# Patient Record
Sex: MALE | Race: WHITE | Employment: FULL TIME | URBAN - NONMETROPOLITAN AREA
[De-identification: names, ages, dates, MRNs, and addresses within clinical notes are randomized per-mention and may not be internally consistent; named-entity substitution may affect disease eponyms.]

---

## 2021-01-06 ENCOUNTER — OFFICE VISIT (OUTPATIENT)
Dept: PSYCHOLOGY | Age: 46
End: 2021-01-06
Payer: COMMERCIAL

## 2021-01-06 DIAGNOSIS — F43.23 ADJUSTMENT DISORDER WITH MIXED ANXIETY AND DEPRESSED MOOD: Primary | ICD-10-CM

## 2021-01-06 DIAGNOSIS — F51.05 INSOMNIA DUE TO ANXIETY AND FEAR: ICD-10-CM

## 2021-01-06 DIAGNOSIS — F40.9 INSOMNIA DUE TO ANXIETY AND FEAR: ICD-10-CM

## 2021-01-06 PROCEDURE — 90791 PSYCH DIAGNOSTIC EVALUATION: CPT | Performed by: PSYCHOLOGIST

## 2021-01-06 ASSESSMENT — PATIENT HEALTH QUESTIONNAIRE - PHQ9
SUM OF ALL RESPONSES TO PHQ9 QUESTIONS 1 & 2: 2
10. IF YOU CHECKED OFF ANY PROBLEMS, HOW DIFFICULT HAVE THESE PROBLEMS MADE IT FOR YOU TO DO YOUR WORK, TAKE CARE OF THINGS AT HOME, OR GET ALONG WITH OTHER PEOPLE: 1
5. POOR APPETITE OR OVEREATING: 0
6. FEELING BAD ABOUT YOURSELF - OR THAT YOU ARE A FAILURE OR HAVE LET YOURSELF OR YOUR FAMILY DOWN: 0
SUM OF ALL RESPONSES TO PHQ QUESTIONS 1-9: 8
2. FEELING DOWN, DEPRESSED OR HOPELESS: 1
4. FEELING TIRED OR HAVING LITTLE ENERGY: 3

## 2021-01-06 NOTE — PATIENT INSTRUCTIONS
Try this method of journaling:     -4 things that went well today   -3 things you are thankful for   -2 things that didn't go as well today but you can learn from   -1 thing you are looking forward to      Dream re-scripting: In a nutshell, this uses techniques that help people with PTSD rescript or alter the endings of their nightmares while they are awake. When you come up with an alternative, less distressing outcome, nightmares can become less upsetting and debilitating. Spending time in the afternoon or evening picking one dream and writing from the beginning, middle, and coming up with a new, alternative ending. Bring this to the session and we can discuss.

## 2021-01-06 NOTE — PROGRESS NOTES
Behavioral Health Consultation/Psychotherapy Note  Yue Records. Pili Garcia Psy.D. Visit Date:  1/6/2021    Patient:  Pete Ramos  YOB: 1975  Chief Complaint:  New Patient, Anxiety, and Stress    Duration of session:  50 minutes      S:     Ct said he hasn't been able to sleep well, having recurring dreams that started in May 2020. 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 He has been tearful at times, getting anxious, shaky, and feeling claustrophobic. He has been present for a lot of COVID-19 deaths, which he didn't realized bothered him as much as they do. He has bad dreams 4-5 days per week. He has a recurring dream that he's at , going into a pt's room, sees a resident, feels death all around, sees a woman's face. Other themes include death, stress, etc.  The worst dream he has twice monthly he has where he thinks he murdered someone and he has to hide the body. He knows he didn't do it, but it still is recurring. He wakes up often from the dream, and gets about 4-5 hours of sleep. He has gotten more anxious at night knowing he may not sleep well, and wake up anxious during the night, and tried in the morning. He has talked to his wife about it, and she's supportive of him. He keeps active, exercises often, gets 10 K steps per day, walks the dog, has a healthy diet. He said he has taken Zoloft and Paxil in the past.  He has 2 kids, age 15 and 8. He said he doesn't feel like he's himself when he's on Zoloft, numbness, staring, so much that his family notices. Aside from his wife, he has some friends, but hasn't talked to them much since COVID-19 started. He has a decent relationship with his parents but doesn't discuss things with them. Treatment options were presented today, starting with a dream re-scripting writing exercise and potential EMDR in the future.       O:    Appearance    Patient presents as alert, oriented, and cooperative  Appetite normal  Sleep disturbance Yes  Loss of pleasure Some  Speech    normal rate, normal volume, well articulated and clear and understandable  Mood    Euthymic, somewhat anxious   Affect    normal affect  Thought Process    goal directed and linear and coherent  Insight    Good  Judgment    Intact  Memory    recent and remote memory intact  Suicide Assessment    no suicidal ideation      A:    1. Adjustment disorder with mixed anxiety and depressed mood    2. Insomnia due to anxiety and fear        Ct's score on PHQ-9 and VERO-7 do not indicate presence of a MDD or VERO. Adjustment D/O seems most appropriate at this time. Ct is agreeable to psychotherapy to address dreams, anxiety, and insomnia. EMDR may also be considered in future. PHQ Scores 1/6/2021   PHQ2 Score 2   PHQ9 Score 8     Interpretation of Total Score Depression Severity: 1-4 = Minimal depression, 5-9 = Mild depression, 10-14 = Moderate depression, 15-19 = Moderately severe depression, 20-27 = Severe depression      VERO-7    Over the last 2 weeks, how often have you been bothered by the following problems? 1. Feeling nervous, anxious, or on edge   [  ] Not at all (0)  [ X ] Several days (1)  [  ] Over half the days (2)  [  ] Nearly every day (3)    2. Not being able to stop or control worrying    [  ] Not at all (0)  [ X ] Several days (1)  [  ] Over half the days (2)  [  ] Nearly every day (3)    3. Worrying too much about different things    [ X ] Not at all (0)  [  ] Several days (1)  [  ] Over half the days (2)  [  ] Nearly every day (3)    4. Trouble relaxing    [ X ] Not at all (0)  [  ] Several days (1)  [  ] Over half the days (2)  [  ] Nearly every day (3)    5.  Being so restless that its hard to sit still    [ X ] Not at all (0)  [  ] Several days (1)  [  ] Over half the days (2)  [  ] Nearly every day (3)    6. Becoming easily annoyed or irritable    [  ] Not at all (0)  [ X ] Several days (1)  [  ] Over half the days (2)  [  ] Nearly every day (3)    7. Feeling afraid as if something awful might happen    [  ] Not at all (0)  [ X ] Several days (1)  [  ] Over half the days (2)  [  ] Nearly every day (3)    Total Score:  Severity:  [ 4 ] 0-4 Subclinical  [  ] 5-9 Mild  [  ] 10-14 Moderate  [  ] 15-21 Severe        P:    Dream re-scripting (writing or verbal) exercise. Daily gratitude list.    Doxy visit in 2 weeks. All questions about treatment plan answered. Patient instructed to go immediately to the emergency room and/or call 911 if any suicidal or homicidal ideations. Patient stated understanding and is agreeable to treatment and crisis plan.           Provider Signature:  Electronically signed by Gabriela Ag PSYD on 1/6/2021 at 11:16 AM

## 2021-01-21 ENCOUNTER — VIRTUAL VISIT (OUTPATIENT)
Dept: PSYCHOLOGY | Age: 46
End: 2021-01-21
Payer: COMMERCIAL

## 2021-01-21 DIAGNOSIS — F43.23 ADJUSTMENT DISORDER WITH MIXED ANXIETY AND DEPRESSED MOOD: Primary | ICD-10-CM

## 2021-01-21 DIAGNOSIS — F40.9 INSOMNIA DUE TO ANXIETY AND FEAR: ICD-10-CM

## 2021-01-21 DIAGNOSIS — F51.05 INSOMNIA DUE TO ANXIETY AND FEAR: ICD-10-CM

## 2021-01-21 PROCEDURE — 90834 PSYTX W PT 45 MINUTES: CPT | Performed by: PSYCHOLOGIST

## 2021-01-21 NOTE — PROGRESS NOTES
Behavioral Health Consultation/Psychotherapy Note  Samantha Jewell. Jai Brown Psy.D. Visit Date:  1/21/2021    Patient:  Lyn Jordan  YOB: 1975  Chief Complaint:  Follow-up, Anxiety, and Stress    Duration of session:  45 minutes      S:     This session was conducted as a telepsychology visit due to [facility] restrictions placed on in-person visits d/t the COVID-19 outbreak. Patient Location: Home       Provider Location ([facility]): [facility]    Patient gave verbal consent for teleservices and will sign a consent form when feasible. This virtual visit was conducted via interactive/real-time audio/video, using Doxy. me. Ct has been off for 2 weeks of work after getting a concussion from being knocked over by his dog. He hasn't been sleeping as well, but is trying to be thankful, slow down, and not take things for granted. He hasn't had the recurring dreams anymore, but we talked about how they could return. We reviewed mindful meditations that can help today. O:    Appearance    Patient presents as alert, oriented, and cooperative  Appetite normal  Sleep disturbance Yes  Loss of pleasure Some  Speech    normal rate, normal volume, well articulated and clear and understandable  Mood    Euthymic, somewhat anxious              Affect    normal affect  Thought Process    goal directed and linear and coherent  Insight    Good  Judgment    Intact  Memory    recent and remote memory intact  Suicide Assessment    no suicidal ideation      A:    1. Adjustment disorder with mixed anxiety and depressed mood    2. Insomnia due to anxiety and fear        Continued psychotherapy using mindfulness meditation, CBT, and anxiety-reduction techniques are needed to assist with returning to a level of well-being. P:    Doxy visit in 2-4 weeks. All questions about treatment plan answered. Patient instructed to go immediately to the emergency room and/or call 911 if any suicidal or

## 2021-02-18 ENCOUNTER — VIRTUAL VISIT (OUTPATIENT)
Dept: PSYCHOLOGY | Age: 46
End: 2021-02-18
Payer: COMMERCIAL

## 2021-02-18 DIAGNOSIS — F40.9 INSOMNIA DUE TO ANXIETY AND FEAR: ICD-10-CM

## 2021-02-18 DIAGNOSIS — F43.23 ADJUSTMENT DISORDER WITH MIXED ANXIETY AND DEPRESSED MOOD: Primary | ICD-10-CM

## 2021-02-18 DIAGNOSIS — F51.05 INSOMNIA DUE TO ANXIETY AND FEAR: ICD-10-CM

## 2021-02-18 PROCEDURE — 90834 PSYTX W PT 45 MINUTES: CPT | Performed by: PSYCHOLOGIST

## 2021-02-18 NOTE — PROGRESS NOTES
Behavioral Health Consultation/Psychotherapy Note  Samantha Jewell. Jai Brown Psy.D. Visit Date:  2021    Patient:  Lyn Jordan  YOB: 1975  Chief Complaint:  Follow-up, Anxiety, Depression, and Stress    Duration of session:  50 minutes      S:     This session was conducted as a telepsychology visit due to [facility] restrictions placed on in-person visits d/t the COVID-19 outbreak. Patient Location: Home       Provider Location ([facility]    Patient gave verbal consent for teleservices and will sign a consent form when feasible. This virtual visit was conducted via interactive/real-time audio/video, using Doxy. me. Ct said his son's friend  in a snowmobile accident, so that's why he couldn't make the last appt. He is helping his son cope. 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 He has an angiogram in a month to really tell if it's an aneurysm or not. He said this has been weighing on his mind a lot. He's not afraid of dying, and feels more at ease about it. He's about 25% upset about it. He's also had the recurring dream again, but not as often, and his brain has been able to stop the dream and switch to something else. He's happy with where things are but wondered what our plan would be should the dreams return. We talked about verbally processing, writing/rescripting, and EMDR as potential treatment options. O:    Appearance    Patient presents as alert, oriented, and cooperative  Appetite normal  Sleep disturbance Yes  Loss of pleasure Some  Speech    normal rate, normal volume, well articulated and clear and understandable  Mood    Euthymic, somewhat anxious              Affect    normal affect  Thought Process    goal directed and linear and coherent  Insight    Good  Judgment    Intact  Memory    recent and remote memory intact  Suicide Assessment    no suicidal ideation    A:    1.  Adjustment disorder with mixed anxiety and depressed mood    2. Insomnia due to anxiety and fear        Continued psychotherapy using mindfulness meditation, CBT, and anxiety-reduction techniques are needed to assist with returning to a level of well-being. P:    Doxy visit in 1 month. All questions about treatment plan answered. Patient instructed to go immediately to the emergency room and/or call 911 if any suicidal or homicidal ideations. Patient stated understanding and is agreeable to treatment and crisis plan.           Provider Signature:  Electronically signed by Pooja Anthony PSYD on 2/18/2021 at 3:04 PM

## 2021-03-16 ENCOUNTER — VIRTUAL VISIT (OUTPATIENT)
Dept: PSYCHOLOGY | Age: 46
End: 2021-03-16
Payer: COMMERCIAL

## 2021-03-16 DIAGNOSIS — F43.23 ADJUSTMENT DISORDER WITH MIXED ANXIETY AND DEPRESSED MOOD: Primary | ICD-10-CM

## 2021-03-16 DIAGNOSIS — F51.05 INSOMNIA DUE TO ANXIETY AND FEAR: ICD-10-CM

## 2021-03-16 DIAGNOSIS — F40.9 INSOMNIA DUE TO ANXIETY AND FEAR: ICD-10-CM

## 2021-03-16 PROCEDURE — 90834 PSYTX W PT 45 MINUTES: CPT | Performed by: PSYCHOLOGIST

## 2021-03-16 NOTE — PROGRESS NOTES
Behavioral Health Consultation/Psychotherapy Note  Prudence Lomeli. Rolinda Goodpasture, Psy.D. Visit Date:  3/16/2021    Patient:  Britnty Looney  YOB: 1975  Chief Complaint:  Follow-up, Anxiety, and Stress    Duration of session:  45 minutes      S:     This session was conducted as a telepsychology visit due to [facility] restrictions placed on in-person visits d/t the COVID-19 outbreak. Patient Location: Home       Provider Location ([facility]): [facility]    Patient gave verbal consent for teleservices and will sign a consent form when feasible. This virtual visit was conducted via interactive/real-time audio/video, using Doxy. me. He has the angiogram this Friday. He's nervous about it but knows there's nothing he can do about it. He said he was told it's 50/50 if he has it or not. We did some positive reframing about how 1 of 2 good outcomes will come out of this appt. He's still had the recurring dream but it hasn't been nearly as bad. He said doing the dream re-scripting has helped a great deal.      Several of the older adults in his family have been vaccinated and he's happy about that. O:    Appearance    Patient presents as alert, oriented, and cooperative  Appetite normal  Sleep disturbance Yes  Loss of pleasure Some  Speech    normal rate, normal volume, well articulated and clear and understandable  Mood    Euthymic, somewhat anxious              Affect    normal affect  Thought Process    goal directed and linear and coherent  Insight    Good  Judgment    Intact  Memory    recent and remote memory intact  Suicide Assessment    no suicidal ideation    A:    1. Adjustment disorder with mixed anxiety and depressed mood    2. Insomnia due to anxiety and fear        Continued psychotherapy using mindfulness meditation, CBT, and anxiety-reduction techniques are needed to assist with returning to a level of well-being.      P:    Doxy visit in 1 month.     All questions about treatment plan answered. Patient instructed to go immediately to the emergency room and/or call 911 if any suicidal or homicidal ideations. Patient stated understanding and is agreeable to treatment and crisis plan.           Provider Signature:  Electronically signed by India Jean PSYD on 3/16/2021 at 4:14 PM

## 2021-05-18 ENCOUNTER — VIRTUAL VISIT (OUTPATIENT)
Dept: PSYCHOLOGY | Age: 46
End: 2021-05-18
Payer: COMMERCIAL

## 2021-05-18 DIAGNOSIS — F43.23 ADJUSTMENT DISORDER WITH MIXED ANXIETY AND DEPRESSED MOOD: ICD-10-CM

## 2021-05-18 DIAGNOSIS — F51.05 INSOMNIA DUE TO ANXIETY AND FEAR: Primary | ICD-10-CM

## 2021-05-18 DIAGNOSIS — F40.9 INSOMNIA DUE TO ANXIETY AND FEAR: Primary | ICD-10-CM

## 2021-05-18 PROCEDURE — 90834 PSYTX W PT 45 MINUTES: CPT | Performed by: PSYCHOLOGIST

## 2021-05-18 NOTE — PROGRESS NOTES
Behavioral Health Consultation/Psychotherapy Note  Jamila Quiroz. Stefania Farris Psy.D. Visit Date:  2021    Patient:  Kerrie Hampton  YOB: 1975  Chief Complaint:  Follow-up, Anxiety, and Stress    Duration of session:  45 minutes      S:     This session was conducted as a telepsychology visit due to [facility] restrictions placed on in-person visits d/t the COVID-19 outbreak. Patient Location: Home       Provider Location ([facility]): [facility]    Patient gave verbal consent for teleservices and will sign a consent form when feasible. This virtual visit was conducted via interactive/real-time audio/video, using Doxy. me. Ct said they had another family  last month. He said it was confirmed he does have an aneurysm, but it's very small and he shouldn't worry about it. He said it's hard not to worry about something like that, especially since he was denied life insurance. Sleeping has continued to be the biggest problem. He had another sleep study. He will be getting fitted for a CPAP this week. He said the dental implant he had been using wasn't working properly. He has been getting the exercise he's needed. He said he's still having the dreams, but they are fewer and far between and less intense. He's got plans for doing things the next couple of months. O:    Appearance    Patient presents as alert, oriented, and cooperative  Appetite normal  Sleep disturbance Yes  Loss of pleasure Some  Speech    normal rate, normal volume, well articulated and clear and understandable  Mood    Euthymic, somewhat anxious              Affect    normal affect  Thought Process    goal directed and linear and coherent  Insight    Good  Judgment    Intact  Memory    recent and remote memory intact  Suicide Assessment    no suicidal ideation      A:    1. Insomnia due to anxiety and fear    2.  Adjustment disorder with mixed anxiety and depressed mood        Continued psychotherapy using mindfulness meditation, CBT, and anxiety-reduction techniques are needed to assist with returning to a level of well-being.  Continued work needed to address the insomnia. P:    Doxy visit in 4-6 weeks. All questions about treatment plan answered. Patient instructed to go immediately to the emergency room and/or call 911 if any suicidal or homicidal ideations. Patient stated understanding and is agreeable to treatment and crisis plan.           Provider Signature:  Electronically signed by Javier Alonzo PSYD on 5/18/2021 at 3:35 PM

## 2021-07-01 ENCOUNTER — VIRTUAL VISIT (OUTPATIENT)
Dept: PSYCHOLOGY | Age: 46
End: 2021-07-01
Payer: COMMERCIAL

## 2021-07-01 DIAGNOSIS — F43.23 ADJUSTMENT DISORDER WITH MIXED ANXIETY AND DEPRESSED MOOD: ICD-10-CM

## 2021-07-01 DIAGNOSIS — F51.05 INSOMNIA DUE TO ANXIETY AND FEAR: Primary | ICD-10-CM

## 2021-07-01 DIAGNOSIS — F40.9 INSOMNIA DUE TO ANXIETY AND FEAR: Primary | ICD-10-CM

## 2021-07-01 PROCEDURE — 90834 PSYTX W PT 45 MINUTES: CPT | Performed by: PSYCHOLOGIST

## 2021-07-01 NOTE — PROGRESS NOTES
Behavioral Health Consultation/Psychotherapy Note  Chad Sultana. Tito Dickerson Psy.D. Visit Date:  7/1/2021    Patient:  Regan Whitaker  YOB: 1975  Chief Complaint:  Follow-up, Anxiety, and Stress    Duration of session:  40 minutes      S:     This session was conducted as a telepsychology visit due to [facility] restrictions placed on in-person visits d/t the COVID-19 outbreak. Patient Location: Home       [facility]/State[facility] [address] [facility]    Patient gave verbal consent for teleservices and will sign a consent form when feasible. This virtual visit was conducted via interactive/real-time audio/video, using Doxy. me. Ct said he got the CPAP, and he's getting more sleep. He doesn't feel as tired, increasing the quantity and quality of his sleep. He's also not waking up nearly as much at night. He said he's only had 1 dream in the last 6 weeks and it wasn't anything major. He feels very good about where things are in his life right now. Getting good sleep has made a world of difference. He continues to exercise well and eat well. He did learn his first g/f passed away from cancer recently which was very sad and we processed this. O:    Appearance    Patient presents as alert, oriented, and cooperative  Appetite normal  Sleep disturbance Yes  Loss of pleasure Some  Speech    normal rate, normal volume, well articulated and clear and understandable  Mood    Euthymic, somewhat anxious              Affect    normal affect  Thought Process    goal directed and linear and coherent  Insight    Good  Judgment    Intact  Memory    recent and remote memory intact  Suicide Assessment    no suicidal ideation      A:    1. Insomnia due to anxiety and fear    2. Adjustment disorder with mixed anxiety and depressed mood        Ct doing much better managing his anxiety now that he's sleeping well. P:    Doxy visit in 4-6 weeks.     All questions about treatment plan answered. Patient instructed to go immediately to the emergency room and/or call 911 if any suicidal or homicidal ideations. Patient stated understanding and is agreeable to treatment and crisis plan.           Provider Signature:  Electronically signed by Sabrina Dejesus PSYD on 7/1/2021 at 3:05 PM

## 2021-09-23 ENCOUNTER — VIRTUAL VISIT (OUTPATIENT)
Dept: PSYCHOLOGY | Age: 46
End: 2021-09-23
Payer: COMMERCIAL

## 2021-09-23 DIAGNOSIS — F41.9 ANXIETY DISORDER, UNSPECIFIED TYPE: Primary | ICD-10-CM

## 2021-09-23 PROCEDURE — 90834 PSYTX W PT 45 MINUTES: CPT | Performed by: PSYCHOLOGIST

## 2021-09-23 NOTE — PROGRESS NOTES
Behavioral Health Consultation/Psychotherapy Note  Maricel Fernández. Tylor Nicholas Psy.D. Visit Date:  9/23/2021    Patient:  Meghan Alberto  YOB: 1975  Chief Complaint:  Follow-up, Anxiety, and Stress    Duration of session:  50 minutes      S:     This session was conducted as a telepsychology visit due to [facility] restrictions placed on in-person visits d/t the COVID-19 outbreak. Patient Location: Home       Provider Location ([facility]): [facility]    Patient gave verbal consent for teleservices and will sign a consent form when feasible. This virtual visit was conducted via interactive/real-time audio/video, using Doxy. me. Ct said he's just getting over COVID-19, despite being vaccinated. He had some moderate symptoms for the past 4-5 days. He also had bad anxiety and panic attacks. He finally wasn't having headaches anymore as of yesterday, it felt like he had a concussion again. He said this past Monday he had a really bad anxiety attack. We talked about the nature of panic. He said he's always had trouble being anxious about work things and bringing them home. We talked about the worry worksheet and other tools to help him. O:    Appearance    Patient presents as alert, oriented, and cooperative  Appetite normal  Sleep disturbance Yes  Loss of pleasure Some  Speech    normal rate, normal volume, well articulated and clear and understandable  Mood    Euthymic, somewhat anxious              Affect    normal affect  Thought Process    goal directed and linear and coherent  Insight    Good  Judgment    Intact  Memory    recent and remote memory intact  Suicide Assessment    no suicidal ideation      A:    1. Anxiety disorder, unspecified type      Anxiety has increased after getting COVID-19. Psychotherapy re-focused on anxiety relief. P:    Check on status of the worry worksheet. Doxy visit in 2-4 weeks. All questions about treatment plan answered. Patient instructed to go immediately to the emergency room and/or call 911 if any suicidal or homicidal ideations. Patient stated understanding and is agreeable to treatment and crisis plan.           Provider Signature:  Electronically signed by Lady Nidhi PSYD on 9/23/2021 at 6:09 PM

## 2021-12-20 ENCOUNTER — VIRTUAL VISIT (OUTPATIENT)
Dept: PSYCHOLOGY | Age: 46
End: 2021-12-20
Payer: COMMERCIAL

## 2021-12-20 DIAGNOSIS — F41.9 ANXIETY DISORDER, UNSPECIFIED TYPE: Primary | ICD-10-CM

## 2021-12-20 PROCEDURE — 90834 PSYTX W PT 45 MINUTES: CPT | Performed by: PSYCHOLOGIST

## 2021-12-20 NOTE — PROGRESS NOTES
Behavioral Health Consultation/Psychotherapy Note  Lawanda Wolff. Anh Ibarra Psy.D. Visit Date:  12/20/2021    Patient:  Chris Grace  YOB: 1975  Chief Complaint:  Follow-up, Depression, Anxiety, and Stress    Duration of session:  45 minutes      S:     This session was conducted as a telepsychology visit due to [facility] restrictions placed on in-person visits d/t the COVID-19 outbreak. Patient Location: Home       Provider Location ([facility]): [facility]    Patient gave verbal consent for teleservices and will sign a consent form when feasible. This virtual visit was conducted via interactive/real-time audio/video, using Doxy. me. Ct said work has been very stressful. Due to changes in Medicare part B starting in 2022, he will have to  more visits and his JUDITH's will not be able to. He's keeping his employment options open. We processed this for a while. He also said he hasn't had any nightmares for a while. Still, he's had a lot of anxiety, especially social anxiety and work stress. The worry worksheet has helped. He's thinking about a medication trial from his PCP. O:    Appearance    Patient presents as alert, oriented, and cooperative  Appetite normal  Sleep disturbance Yes  Loss of pleasure Some  Speech    normal rate, normal volume, well articulated and clear and understandable  Mood    Euthymic, somewhat anxious              Affect    normal affect  Thought Process    goal directed and linear and coherent  Insight    Good  Judgment    Intact  Memory    recent and remote memory intact  Suicide Assessment    no suicidal ideation      A:    1. Anxiety disorder, unspecified type        Anxiety has increased after getting COVID-19. Psychotherapy re-focused on anxiety relief. P:    Doxy visit in 2-4 weeks. All questions about treatment plan answered. Patient instructed to go immediately to the emergency room and/or call 911 if any suicidal or

## 2022-01-27 ENCOUNTER — VIRTUAL VISIT (OUTPATIENT)
Dept: PSYCHOLOGY | Age: 47
End: 2022-01-27
Payer: COMMERCIAL

## 2022-01-27 DIAGNOSIS — F41.9 ANXIETY DISORDER, UNSPECIFIED TYPE: Primary | ICD-10-CM

## 2022-01-27 PROCEDURE — 90832 PSYTX W PT 30 MINUTES: CPT | Performed by: PSYCHOLOGIST

## 2022-05-09 ENCOUNTER — HOSPITAL ENCOUNTER (OUTPATIENT)
Dept: MRI IMAGING | Age: 47
Discharge: HOME | End: 2022-05-09
Payer: COMMERCIAL

## 2022-05-09 DIAGNOSIS — I67.1 ANEURYSM OF ANTERIOR CEREBRAL ARTERY: ICD-10-CM

## 2022-05-09 PROCEDURE — 70544 MR ANGIOGRAPHY HEAD W/O DYE: CPT

## 2023-05-22 ENCOUNTER — HOSPITAL ENCOUNTER (OUTPATIENT)
Dept: MRI IMAGING | Age: 48
Discharge: HOME | End: 2023-05-22
Payer: COMMERCIAL

## 2023-05-22 DIAGNOSIS — I67.1 ANEURYSM OF ANTERIOR CEREBRAL ARTERY: ICD-10-CM

## 2023-05-22 PROCEDURE — 70544 MR ANGIOGRAPHY HEAD W/O DYE: CPT

## 2025-04-30 ENCOUNTER — TRANSCRIBE ORDERS (OUTPATIENT)
Dept: ADMINISTRATIVE | Age: 50
End: 2025-04-30

## 2025-04-30 DIAGNOSIS — I67.1 CEREBRAL ANEURYSM, NONRUPTURED: Primary | ICD-10-CM

## 2025-05-28 ENCOUNTER — HOSPITAL ENCOUNTER (OUTPATIENT)
Dept: MRI IMAGING | Age: 50
Discharge: HOME | End: 2025-05-28
Payer: COMMERCIAL

## 2025-05-28 DIAGNOSIS — I67.1 CEREBRAL ANEURYSM, NONRUPTURED: ICD-10-CM

## 2025-05-28 PROCEDURE — 70544 MR ANGIOGRAPHY HEAD W/O DYE: CPT
